# Patient Record
Sex: FEMALE | Race: WHITE
[De-identification: names, ages, dates, MRNs, and addresses within clinical notes are randomized per-mention and may not be internally consistent; named-entity substitution may affect disease eponyms.]

---

## 2020-03-25 ENCOUNTER — HOSPITAL ENCOUNTER (INPATIENT)
Dept: HOSPITAL 25 - ED | Age: 50
LOS: 3 days | Discharge: HOME | DRG: 100 | End: 2020-03-28
Attending: INTERNAL MEDICINE | Admitting: NURSE PRACTITIONER
Payer: COMMERCIAL

## 2020-03-25 DIAGNOSIS — K29.80: ICD-10-CM

## 2020-03-25 DIAGNOSIS — R41.0: ICD-10-CM

## 2020-03-25 DIAGNOSIS — J96.01: ICD-10-CM

## 2020-03-25 DIAGNOSIS — R65.10: ICD-10-CM

## 2020-03-25 DIAGNOSIS — J98.11: ICD-10-CM

## 2020-03-25 DIAGNOSIS — Z83.3: ICD-10-CM

## 2020-03-25 DIAGNOSIS — M48.54XA: ICD-10-CM

## 2020-03-25 DIAGNOSIS — F41.9: ICD-10-CM

## 2020-03-25 DIAGNOSIS — G40.509: Primary | ICD-10-CM

## 2020-03-25 DIAGNOSIS — F10.10: ICD-10-CM

## 2020-03-25 DIAGNOSIS — T40.4X1A: ICD-10-CM

## 2020-03-25 DIAGNOSIS — M54.9: ICD-10-CM

## 2020-03-25 DIAGNOSIS — F11.10: ICD-10-CM

## 2020-03-25 DIAGNOSIS — T42.6X5A: ICD-10-CM

## 2020-03-25 DIAGNOSIS — Z82.5: ICD-10-CM
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
## 2020-03-25 DIAGNOSIS — E87.70: ICD-10-CM

## 2020-03-25 DIAGNOSIS — R32: ICD-10-CM

## 2020-03-25 DIAGNOSIS — K57.30: ICD-10-CM

## 2020-03-25 DIAGNOSIS — Z82.49: ICD-10-CM

## 2020-03-25 DIAGNOSIS — M62.82: ICD-10-CM

## 2020-03-25 DIAGNOSIS — J44.1: ICD-10-CM

## 2020-03-25 DIAGNOSIS — G93.40: ICD-10-CM

## 2020-03-25 DIAGNOSIS — T42.6X1A: ICD-10-CM

## 2020-03-25 DIAGNOSIS — F17.210: ICD-10-CM

## 2020-03-25 DIAGNOSIS — F19.10: ICD-10-CM

## 2020-03-25 DIAGNOSIS — D72.829: ICD-10-CM

## 2020-03-25 LAB
ALBUMIN SERPL BCG-MCNC: 4 G/DL (ref 3.2–5.2)
ALBUMIN/GLOB SERPL: 1.4 {RATIO} (ref 1–3)
ALP SERPL-CCNC: 65 U/L (ref 34–104)
ALT SERPL W P-5'-P-CCNC: 37 U/L (ref 7–52)
ANION GAP SERPL CALC-SCNC: 9 MMOL/L (ref 2–11)
APAP SERPL-MCNC: < 15 MCG/ML
AST SERPL-CCNC: 31 U/L (ref 13–39)
BASOPHILS # BLD AUTO: 0 10^3/UL (ref 0–0.2)
BUN SERPL-MCNC: 16 MG/DL (ref 6–24)
BUN/CREAT SERPL: 21.6 (ref 8–20)
CALCIUM SERPL-MCNC: 8.9 MG/DL (ref 8.6–10.3)
CHLORIDE SERPL-SCNC: 102 MMOL/L (ref 101–111)
EOSINOPHIL # BLD AUTO: 0 10^3/UL (ref 0–0.6)
GLOBULIN SER CALC-MCNC: 2.8 G/DL (ref 2–4)
GLUCOSE SERPL-MCNC: 138 MG/DL (ref 70–100)
HCO3 SERPL-SCNC: 24 MMOL/L (ref 22–32)
HCT VFR BLD AUTO: 50 % (ref 35–47)
HGB BLD-MCNC: 17.2 G/DL (ref 12–16)
INR PPP/BLD: 1.1 (ref 0.82–1.09)
LYMPHOCYTES # BLD AUTO: 1.8 10^3/UL (ref 1–4.8)
MCH RBC QN AUTO: 32 PG (ref 27–31)
MCHC RBC AUTO-ENTMCNC: 34 G/DL (ref 31–36)
MCV RBC AUTO: 92 FL (ref 80–97)
MONOCYTES # BLD AUTO: 2.4 10^3/UL (ref 0–0.8)
NEUTROPHILS # BLD AUTO: 20.6 10^3/UL (ref 1.5–7.7)
NRBC # BLD AUTO: 0 10^3/UL
NRBC BLD QL AUTO: 0
PLATELET # BLD AUTO: 239 10^3/UL (ref 150–450)
POTASSIUM SERPL-SCNC: 3.1 MMOL/L (ref 3.5–5)
PROT SERPL-MCNC: 6.8 G/DL (ref 6.4–8.9)
RBC # BLD AUTO: 5.47 10^6 /UL (ref 3.7–4.87)
SALICYLATES SERPL-MCNC: < 2.5 MG/DL (ref ?–30)
SODIUM SERPL-SCNC: 135 MMOL/L (ref 135–145)
WBC # BLD AUTO: 24.8 10^3/UL (ref 3.5–10.8)

## 2020-03-25 PROCEDURE — 95819 EEG AWAKE AND ASLEEP: CPT

## 2020-03-25 PROCEDURE — 71275 CT ANGIOGRAPHY CHEST: CPT

## 2020-03-25 PROCEDURE — 84146 ASSAY OF PROLACTIN: CPT

## 2020-03-25 PROCEDURE — 96367 TX/PROPH/DG ADDL SEQ IV INF: CPT

## 2020-03-25 PROCEDURE — 85610 PROTHROMBIN TIME: CPT

## 2020-03-25 PROCEDURE — 00JU3ZZ INSPECTION OF SPINAL CANAL, PERCUTANEOUS APPROACH: ICD-10-PCS | Performed by: EMERGENCY MEDICINE

## 2020-03-25 PROCEDURE — 74177 CT ABD & PELVIS W/CONTRAST: CPT

## 2020-03-25 PROCEDURE — 93306 TTE W/DOPPLER COMPLETE: CPT

## 2020-03-25 PROCEDURE — G0480 DRUG TEST DEF 1-7 CLASSES: HCPCS

## 2020-03-25 PROCEDURE — 81003 URINALYSIS AUTO W/O SCOPE: CPT

## 2020-03-25 PROCEDURE — 72129 CT CHEST SPINE W/DYE: CPT

## 2020-03-25 PROCEDURE — 96375 TX/PRO/DX INJ NEW DRUG ADDON: CPT

## 2020-03-25 PROCEDURE — 71045 X-RAY EXAM CHEST 1 VIEW: CPT

## 2020-03-25 PROCEDURE — 80307 DRUG TEST PRSMV CHEM ANLYZR: CPT

## 2020-03-25 PROCEDURE — 99285 EMERGENCY DEPT VISIT HI MDM: CPT

## 2020-03-25 PROCEDURE — 81015 MICROSCOPIC EXAM OF URINE: CPT

## 2020-03-25 PROCEDURE — 83605 ASSAY OF LACTIC ACID: CPT

## 2020-03-25 PROCEDURE — 82746 ASSAY OF FOLIC ACID SERUM: CPT

## 2020-03-25 PROCEDURE — 96365 THER/PROPH/DIAG IV INF INIT: CPT

## 2020-03-25 PROCEDURE — 85025 COMPLETE CBC W/AUTO DIFF WBC: CPT

## 2020-03-25 PROCEDURE — 82607 VITAMIN B-12: CPT

## 2020-03-25 PROCEDURE — 36415 COLL VENOUS BLD VENIPUNCTURE: CPT

## 2020-03-25 PROCEDURE — 86140 C-REACTIVE PROTEIN: CPT

## 2020-03-25 PROCEDURE — 80171 DRUG SCREEN QUANT GABAPENTIN: CPT

## 2020-03-25 PROCEDURE — 84484 ASSAY OF TROPONIN QUANT: CPT

## 2020-03-25 PROCEDURE — 83735 ASSAY OF MAGNESIUM: CPT

## 2020-03-25 PROCEDURE — 80329 ANALGESICS NON-OPIOID 1 OR 2: CPT

## 2020-03-25 PROCEDURE — 87040 BLOOD CULTURE FOR BACTERIA: CPT

## 2020-03-25 PROCEDURE — 93005 ELECTROCARDIOGRAM TRACING: CPT

## 2020-03-25 PROCEDURE — 85027 COMPLETE CBC AUTOMATED: CPT

## 2020-03-25 PROCEDURE — 80053 COMPREHEN METABOLIC PANEL: CPT

## 2020-03-25 PROCEDURE — 82550 ASSAY OF CK (CPK): CPT

## 2020-03-25 PROCEDURE — 70450 CT HEAD/BRAIN W/O DYE: CPT

## 2020-03-25 PROCEDURE — 83690 ASSAY OF LIPASE: CPT

## 2020-03-25 PROCEDURE — 80048 BASIC METABOLIC PNL TOTAL CA: CPT

## 2020-03-25 PROCEDURE — 80320 DRUG SCREEN QUANTALCOHOLS: CPT

## 2020-03-26 LAB
ANION GAP SERPL CALC-SCNC: 8 MMOL/L (ref 2–11)
BASOPHILS # BLD AUTO: 0.1 10^3/UL (ref 0–0.2)
BUN SERPL-MCNC: 12 MG/DL (ref 6–24)
BUN/CREAT SERPL: 17.9 (ref 8–20)
CALCIUM SERPL-MCNC: 8.6 MG/DL (ref 8.6–10.3)
CHLORIDE SERPL-SCNC: 102 MMOL/L (ref 101–111)
CK SERPL-CCNC: (no result) U/L (ref 10–223)
EOSINOPHIL # BLD AUTO: 0 10^3/UL (ref 0–0.6)
FOLATE SERPL-MCNC: > 20 NG/ML (ref 3.99–?)
GLUCOSE SERPL-MCNC: 117 MG/DL (ref 70–100)
HCO3 SERPL-SCNC: 23 MMOL/L (ref 22–32)
HCT VFR BLD AUTO: 50 % (ref 35–47)
HGB BLD-MCNC: 17.6 G/DL (ref 12–16)
LYMPHOCYTES # BLD AUTO: 3.1 10^3/UL (ref 1–4.8)
MAGNESIUM SERPL-MCNC: 2 MG/DL (ref 1.9–2.7)
MCH RBC QN AUTO: 32 PG (ref 27–31)
MCHC RBC AUTO-ENTMCNC: 35 G/DL (ref 31–36)
MCV RBC AUTO: 92 FL (ref 80–97)
MONOCYTES # BLD AUTO: 2.4 10^3/UL (ref 0–0.8)
NEUTROPHILS # BLD AUTO: 16.5 10^3/UL (ref 1.5–7.7)
NRBC # BLD AUTO: 0 10^3/UL
NRBC BLD QL AUTO: 0
PLATELET # BLD AUTO: 234 10^3/UL (ref 150–450)
POTASSIUM SERPL-SCNC: (no result) MMOL/L (ref 3.5–5)
RBC # BLD AUTO: 5.49 10^6 /UL (ref 3.7–4.87)
SODIUM SERPL-SCNC: 133 MMOL/L (ref 135–145)
TROPONIN I SERPL-MCNC: 0.01 NG/ML (ref ?–0.03)
WBC # BLD AUTO: 22 10^3/UL (ref 3.5–10.8)

## 2020-03-26 RX ADMIN — PIPERACILLIN AND TAZOBACTAM SCH MLS/HR: 3; .375 INJECTION, POWDER, LYOPHILIZED, FOR SOLUTION INTRAVENOUS; PARENTERAL at 16:57

## 2020-03-26 RX ADMIN — Medication SCH: at 11:13

## 2020-03-26 RX ADMIN — FOLIC ACID SCH: 1 TABLET ORAL at 11:13

## 2020-03-26 RX ADMIN — DEXMEDETOMIDINE HYDROCHLORIDE SCH MLS/HR: 100 INJECTION, SOLUTION, CONCENTRATE INTRAVENOUS at 15:04

## 2020-03-26 RX ADMIN — SODIUM CHLORIDE SCH MLS/HR: 900 IRRIGANT IRRIGATION at 12:04

## 2020-03-26 RX ADMIN — VANCOMYCIN HYDROCHLORIDE SCH MLS/HR: 1 INJECTION, POWDER, LYOPHILIZED, FOR SOLUTION INTRAVENOUS at 22:08

## 2020-03-26 RX ADMIN — THERA TABS SCH: TAB at 11:13

## 2020-03-26 RX ADMIN — LEVETIRACETAM SCH MLS/HR: 100 INJECTION, SOLUTION, CONCENTRATE INTRAVENOUS at 12:38

## 2020-03-26 RX ADMIN — SODIUM CHLORIDE SCH MLS/HR: 900 IRRIGANT IRRIGATION at 01:30

## 2020-03-26 NOTE — EEG
ELECTROENCEPHALOGRAPHY:

 

DATE OF STUDY:  03/26/20

 

LOCATION:  She is an outpatient in room 339.

 

REFERRING PHYSICIAN:  Yolanda Crandall NP

 

CLINICAL PROBLEM:  New onset seizures the evening before this recording.   The 
patient is delirious.

 

MEDICATIONS:  Include:

1.  Keppra.

2.  Folic acid.

3.  Lorazepam detox protocol.

4.  Tramadol p.r.n.

 

REPORT:  This 19-channel EEG is remarkable for background rhythms consisting of 
excessive beta activity seen with a bifrontal predominance.  The patient is 
described as confused and unable to state name or follow commands.  There is 
diffuse background slowing in the low voltage delta and mainly theta range.  
There is an episodic increased theta activity of moderate voltage seen in the 
left frontotemporal region.  The patient may drowse with vertex slowing.  There 
may be some POSTS in the right occipital area not seen in the left.  There are 
no clinical events.  Stage II sleep is not identified.

 

CLINICAL IMPRESSION:  Abnormal EEG due to excessive beta rhythms consistent 
with benzodiazepine drug effect.  In addition, there is diffuse slowing and 
disorganization of background rhythm consistent with diffuse encephalopathy.  
There is suggestion of episodic focal slowing from the left frontal temporal 
region suggestive of possible focal neuronal dysfunction in the left 
frontotemporal region.  There are no ictal events and no epileptiform features 
to this recording.

 

[id number]/[facility] #: [id number]

[facility]

## 2020-03-26 NOTE — CONS
NEUROLOGY CONSULTATION

 

DATE OF CONSULT:  03/26/2020.

 

She is an inpatient in room 339.

 

REFERRING PROVIDER:  Yolanda Crandall NP.

 

PRIMARY CARE PHYSICIAN:  Dr. Amador.

 

CHIEF COMPLAINT:  Seizure, mental status changes.

 

HISTORY OF PRESENT ILLNESS:  Demetrice Johnson is a 49-year-old woman who 
presented to the emergency department via ambulance yesterday, reportedly with 
new onset seizures.  The study is from the medical record as the patient is 
current encephalopathic.  According to the records, she remembers waking up 
with ambulance attendants in her home.  Her  reported to the ambulance 
attendants that she had a seizure the night before and then again yesterday 
early in the day. According the descriptions in the records, they lasted 
several minutes and she appeared diaphoretic, foaming at the mouth, tense 
muscles and her face became dark. The patient was able to give history to 
Yolanda Crandall NP last evening in the emergency room.  She stated that she 
remembers going to the bathroom and the next thing she knows ambulance 
attendants were there.  She reported and her  corroborated that she had 
been sick for about three days with abdominal pain, vomiting, and nausea.  She 
had mid to upper back pain and a frontal headache.  She is not on any 
prescription medications, but according to the discussion between Yolanda Crandall and her , Ramon, over the phone last evening, she was taking his 
Gabapentin.  When she came in, she had a positive urine tox screen for opiates 
and cannabinoids and Dr. Singh tells me that it has been confirmed that she 
was also taking her husbands Suboxone.  The patient admitted to Yolanda Crandall 
that she had had a marijuana treat some time that week.  She also reported that 
she had used an opiate based pill about a week before, but she was unsure what 
it was.  Subsequently, the discovery that she had taken her 's Suboxone 
was made.  An attempt was made at a lumbar puncture in the emergency room, but 
it was unsuccessful.  She had a CT scan of the brain that was interpreted as 
normal.  She was febrile in the emergency room with a temperature of 100.0 and 
she had an elevated white blood cell count. She was treated with Keppra, 
Ampicillin, Ceftriaxone, and Vancomycin in the emergency room, but it was not 
continued.

 

On examination this morning, she is not able to provide any coherent history.  
She does nod at one point when asked if she has a headache.

 

PAST MEDICAL HISTORY:  Notable for a breast biopsy last April, interpreted as 
benign breast tissue with nonproliferative fibrocystic change.  This was done 
for left nipple discharge.  She has a history of cholelithiasis and 
cholecystitis in 2002.  Review of prior medical records indicates a history of 
frequent headaches, anxiety, and bronchitis.

 

HOME MEDICATIONS:  She is reported as having no prescription medications at home
, but has been taking her 's Gabapentin and Suboxone.

 

CURRENT MEDICATIONS:  Her current medications consist of:

1.  Keppra 500 mg p.o. twice per day.

2.  Folic acid 1 mg p.o. daily.

3.  Lorazepam up to 6 mg per [facility] protocol.

4.  Multivitamin one p.o. daily.

5.  Zofran 4 mg IV q.6 hours as needed for nausea.

6.  Vancomycin.

7.  Zosyn.

8.  Piperacillin/Tazobactam have been restarted.

9.  She also also been prescribed Thiamin 100 mg p.o. per day.

10. Vancomycin has been restarted at 1500 mg.

 

ALLERGIES:  According to our computer records, she does not have any drug 
allergies.

 

REVIEW OF SYSTEMS:  From the patient is unproductive due to her encephalopathy.
  Her mentation is too disturbed to get any reliable responses.

 

PHYSICAL EXAM:  Most recent temperature is 98.5, temperature this morning was 
100.2 maximum, T-max during the hospitalization is 100.2.  Blood pressure was 
very elevated early on at about 180/100 to 110 and more recent is down to 142/
85.  She has been tachycardic in the 100 to 110 range and her respiratory rates 
running about 30.  Oxygen saturation is 92 percent on room air. Heart tones 
sound normal. I do not hear any murmurs. Skin is warm and somewhat diaphoretic.
  Oral mucosa is moist.  She has a fair amount of sialorrhea.  On incomplete 
look at her tongue, I did not see any bite marks.  Neurologically she is moving 
restlessly and is nonverbal.  She does nod her head occasionally to a question, 
but it is not considered reliable.  Her pupils react briskly from about 4 to 5 
mm down to about 2.5 to 3 mm.  Her eye movements seem full.  She has roving 
movements and looks about the room.  I cannot get an adequate look at her 
fundus.  She does not consistently respond to visual threat.  She does weakly 
respond to nasal tickle symmetrically.  Facial movement looks symmetrical.  She 
has restless movements of all limbs symmetrically.  There is no myoclonus or 
other adventitious movements noted.  Plantar responses are briskly withdraw 
bilaterally.  She does not follow any commands.

 

DIAGNOSTIC STUDIES/LAB DATA:  EEG just done within the hour which reveals 
excessive beta activity consistent with benzodiazepine drug effect.  There is 
diffuse slowing consistent with diffuse cerebral dysfunction.  There is 
occasional focal slowing from the left frontotemporal area, but there are no 
epileptiform discharges.

 

Other laboratory data is notable for a CBC with a white blood cell count on 
admission of 24.8 with 20.6% absolute neutrophils and a persistent elevated 
white blood cell count this morning at 9:00 a.m. at 22,000.  Again with 
elevated neutrophils at 16.5.  Her hemoglobin is elevated at 17.2 and platelet 
count is normal at 239,000.  Toxicology screen from yesterday was positive for 
opiates and cannabinoids.  Serum alcohol was less than 10.  Her chemistry 
profile was unremarkable.  Initial troponin is 0.01.  C-reactive protein is 
6.5.  Vitamin B12 level this morning is normal at 404.  Prolactin level 
yesterday at 1630 hours was 7.8.  INR is borderline elevated at 1.10.

 

I reviewed her head CT which was interpreted as normal.  There is a suspicion 
on my review of white matter hypodensity in the right apical area of the right 
hemisphere.  Chest x-ray on admission is interpreted as showing patchy 
atelectasis versus consolidation of the right lung base.  A CT of the abdomen 
and pelvis on admission is notable for mild thickening and possible edema 
involving the proximal duodenum.  the radiologist cannot exclude mild 
duodenitis.  CT scan of the thoracic spine was interpreted as no significant 
pathology.  EKG is reviewed and reveals sinus tachycardia and consider right 
atrial enlargement.

 

IMPRESSION:  Impression is that of encephalopathy of unclear etiology, but an 
infectious etiology remains high in the differential.  There is a possibility 
of a right lung infection, but also the possibility of an intracranial 
infection remains.  Her CT scan of the brain is interrupted as normal, but to 
my review I think this is a suspicious area in the very apical white matter of 
the right hemisphere.

 

I discussed my initial impression with Dr. Singh and recommended that the 
patient have an MRI scan of the brain with and without contrast to look for an 
abscess.  If that negative, then I think a lumbar puncture would be indicated.  
Dr. Singh has reinstituted broad spectrum antibiotic coverage.  I agree with 
Keppra therapy for now and I would change it to intravenous.  I will continue 
to follow her along with you.

 

 [id number]/[facility] #: [id number]

KARINA

## 2020-03-26 NOTE — PN
Subjective


Date of Service: 03/26/20


Interval History: 





Admitted overnight. LP not successful. EEG performed and results pending. 





Received collateral from patient's mother Aye Salcedo that patient has been 

experiencing emesis for 3 days. States patient reports she felt very ill for 3 

days and stayed in bed. Starting yesterday, patient started experiencing severe 

upper back pain, she thought it was from being in bed so long. Then, pt's son 

informed pt's mother that pt's  noted that she lost consciousness and so 

he "banged her head against the floor" several times to "wake her up". Her 

 has "brain damage" from history of multiple overdoses. Per patient's 

mother, her family called the ambulance 2 days ago, but the patient refused to 

come to the hospital. They called again yesterday and that is when she 

presented to the ER. The patient is too embarassed about her history of OUD (

intranasal heroin) and has not sought care for herself but takes her 's 

gabapentin and Suboxone. Mother denies h/o IV drug use.





On exam this AM, patient able to state her name and state she has a headache, 

but frequently has agitated delirium and cannot participate in interview. Due 

to respiratory distress and hypoxia, she was transferred to the ICU.








Objective


Active Medications: 








Acetaminophen (Tylenol Tab*)  650 mg PO Q4H PRN


   PRN Reason: PAIN - MILD


Folic Acid (Folvite Tab*)  1 mg PO DAILY [facility]


Sodium Chloride (Ns 0.9% 1000 Ml**)  1,000 mls @ 100 mls/hr IV PER RATE [facility]


   Last Admin: 03/26/20 01:30 Dose:  100 mls/hr


Vancomycin HCl 1,000 mg/ (Sodium Chloride)  250 mls @ 166.667 mls/hr IVPB ONCE 

ONE; Protocol


   Stop: 03/26/20 11:18


Piperacillin Sod/Tazobactam (Sod 3.375 gm/ Sodium Chloride)  100 mls @ 200 mls/

hr IVPB ONCE ONE


   Stop: 03/26/20 10:19


Levetiracetam (Keppra Tab*)  500 mg PO BID [facility]


Lorazepam (Ativan Tab(*))  0 - 6 mg PO .PER [facility] PROTOCOL [facility]; Protocol


   Last Admin: 03/26/20 04:47 Dose:  4 mg


Lorazepam (Ativan Inj*)  0 - 3 mg IV PUSH .PER WAM PROTOCOL JENELLE; Protocol


Miscellaneous (Ativan Pyxis Key)  1 ea N/A .ATIVAN IV KEY PRN


   PRN Reason: PYXIS KEY


Multivitamins/Minerals (Theragran/Minerals Tab*)  1 tab PO DAILY [facility]


Nicotine Polacrilex (Nicotine Gum*)  2 mg PO Q2H PRN


   PRN Reason: CRAVING


Ondansetron HCl (Zofran Inj*)  4 mg IV Q4H PRN


   PRN Reason: NAUSEA


Pharmacy Consult (Vancomycin Per Pharmacy*)  1 note FOLLOW UP .VANC PER 

PHARMACY JENELLE; Protocol


Pharmacy Consult (Zosyn Per Pharmacy*)  1 note FOLLOW UP .ZOSYN PER PHARMACY [facility]


Pharmacy Consult (Zosyn Per Pharmacy*)  1 note FOLLOW UP .ZOSYN PER PHARMACY [facility]


Thiamine HCl (Vitamin B-1 Tab*)  100 mg PO DAILY [facility]


Tramadol HCl (Ultram*)  50 mg PO Q6H PRN


   PRN Reason: PAIN - MODERATE


   Last Admin: 03/26/20 05:10 Dose:  50 mg








 Vital Signs - 8 hr











  03/26/20 03/26/20 03/26/20





  02:27 02:34 02:36


 


Temperature   


 


Pulse Rate   


 


Respiratory 20 20 20





Rate   


 


Blood Pressure   





(mmHg)   


 


O2 Sat by Pulse   





Oximetry   














  03/26/20 03/26/20 03/26/20





  04:02 04:13 04:22


 


Temperature 97.9 F  


 


Pulse Rate 96  


 


Respiratory 20 20 20





Rate   


 


Blood Pressure 148/99  





(mmHg)   


 


O2 Sat by Pulse 94  





Oximetry   














  03/26/20 03/26/20 03/26/20





  04:47 05:10 06:45


 


Temperature   


 


Pulse Rate   


 


Respiratory 20 20 20





Rate   


 


Blood Pressure   





(mmHg)   


 


O2 Sat by Pulse   





Oximetry   














  03/26/20 03/26/20 03/26/20





  06:49 07:24 07:34


 


Temperature 100.2 F  


 


Pulse Rate 106  


 


Respiratory 36 30 32





Rate   


 


Blood Pressure 176/115  





(mmHg)   


 


O2 Sat by Pulse   





Oximetry   














  03/26/20 03/26/20





  07:37 08:02


 


Temperature  98.8 F


 


Pulse Rate  105


 


Respiratory 32 30





Rate  


 


Blood Pressure  146/90





(mmHg)  


 


O2 Sat by Pulse  92





Oximetry  











Oxygen Devices in Use Now: None


Appearance: ill-appearing woman, disheveled


Eyes: No Scleral Icterus


Ears/Nose/Mouth/Throat: - - OP with secretions


Neck: NL Appearance and Movements; NL JVP, Trachea Midline


Respiratory: - - mucous rattle obscuring most adventitious sounds


Cardiovascular: NL Sounds; No Murmurs; No JVD, RRR


Abdominal: - - no grimace to palpation, soft


Extremities: No Edema


Skin: No Rash or Ulcers, - - dirt under nails and over feet


Neurological: - - can state name, will occasionally track with eyes and close 

eyes to visual threat, PERRL, moving all extremities spontaneously and 

symmetrically but unable to follow commands


Result Diagrams: 


 03/26/20 08:59





 03/26/20 11:36





Assess/Plan/Problems-Billing


Assessment: 





49W with OUD (intranasal heroin), h/o alcohol use disorder, and anxiety, who 

presents from home with subacute malaise, back pain, fevers, and eventually 

loss of consciousness. Found with altered mental status and leukocytosis. 








- Patient Problems


(1) Delirium


Comment: Pt with h/o etoh and opioid abuse, and could be withdrawing from 

either. Also with questionable history of seizure at home prior to arrival - 

could be post-ictal. Per collateral from family - pt took extra doses of her 

's suboxone and gapabentin for significant back pain, so medication side 

effect could be playing a role. Finally, this could be toxic-metabolic 

encephalopathy from unclear infection, given fevers and leukocytosis. 


- Neuro consult greatly apprecaited and Keppra started


- [facility] protocol initiated


- supportive care


- on broad spectrum abx in case of bacterial infection


- f/u gabapentin level   





(2) Respiratory distress


Comment: Possible consolidation on CXR, in context of leukocytosis and fevers. 

Unknown if patient had cough or dyspnea. Has been vomiting over the last 3 days 

and may have had a seizure, so aspiration is also likely.


- on broad spectrum abx


- supplemental O2


- transferred to ICU for higher level of care   





(3) Opioid use disorder


Comment: History of intranasal heroin and taking her 's suboxone. Family 

denies IVDU.


- monitor for signs of withdrawal and treat prn symptoms


- consider [facility] referral on discharge   





(4) DVT prophylaxis


Comment: 


- lovenox

## 2020-03-26 NOTE — HP
CC:  Dr. Amador*

 

ADMISSION HISTORY AND PHYSICAL:

 

DATE OF ADMISSION:  03/25/20

 

PRIMARY CARE PHYSICIAN:  Dr. Amador.

 

ADMITTING PHYSICIAN:  Dr. Garcia* (Isreal Flores NP).

 

CHIEF COMPLAINT:  Sick for the last few days.

 

HISTORY OF PRESENT ILLNESS:  Ms. Johnson is a 49-year-old female with past 
medical history significant for anxiety.  She presented to the emergency 
department today via EMS after her  called for what he reports was a 
seizure.  The patient states that she went to bed last night, got up to go to 
the bathroom 1 time and the next thing she remembers is waking up with EMS in 
her home.  She states that her  says that she had a seizure but she is 
unaware of this.  She does not remember any such activity.  She does not 
remember falling. Denies any known injuries or accidents.  She denies any 
history of prior seizures or familial history of seizures.  She states that she 
was sick for the last 3 days with nausea, vomiting, and diarrhea.  She has also 
had some abdominal pain and some left-sided upper back pain for the last few 
days.  She denies eating any new foods or eating any foods that her  did 
not eat. States that her  is in his usual state of health.  States that 
she has had very little fluids and no food for the last few days.  States that 
the abdominal pain is near the middle of her abdomen.  It feels tight at times 
and she is often nauseous.  The back pain is to the mid to upper left area of 
her back, rates 9/10, constant, burning/tight pain.  She denies any respiratory 
symptoms.  No rhinorrhea, sore throat, cough, or shortness of breath.  Denies 
any chest pain unusual or significant joint or muscle aches other than stated 
already.  States that she has also had a headache for the last few days in the 
frontal area.  She has taken ibuprofen, NyQuil, and DayQuil, unsure the last 
time that these were taken, with little to no relief.  The patient agreed that 
her  could be called.  Her 's name is Ramon, number [phone].
  He states that the patient had what looked like a seizure last night as well 
as earlier today.  He states that both episodes lasted for about a few minutes 
each.  The patient was unresponsive to her , noted that she was not 
breathing, her face was turning "black," she was diaphoretic, foaming at the 
mouth, and her muscles would get extremely tense and he could not break her 
muscle tension.  States that the episode earlier today lasted for approximately 
4 minutes.   denies any recent drug use by his wife other than her 
taking his prescribed gabapentin. States he is unaware of any other medications 
that she may take.  He is unsure of how many pills she takes at a time.  He 
states that he thinks sometimes she will take about 10 pills at a time. He 
states she takes these for her back pain.  The patient originally denied any 
illicit drug use.  After urine tox screen came back positive for opiates and 
cannabinoids, the patient was re- questioned about these substances.  The 
patient stated that she did recently have an edible marijuana treat.  Denies 
any injectable drug use.  States that she may have had an opiate-based pill 
about a week ago but is unsure of what it was and is unsure of exactly when.  
She is not very forthcoming with this information.  The patient denies any 
bleeding or clotting disorders.  Denies any recent hospitalizations or recent 
antibiotic use.  While in the emergency department, there was an unsuccessful 
attempt at an LP.  Meningitis was within the differential list originally in 
relation to possible seizure activity especially with a white count of 24.8.  
However, it was discussed with both ED provider and Dr. Garcia that it seems 
very unlikely for the patient to have meningitis.  She has no nuchal rigidity, 
negative Brudzinski sign, negative Kernig sign.  While in the emergency 
department, her temperature did reach 100.0, tachycardic at times with heart 
rate in the 80s to low 100s, respiratory rate within normal limits, blood 
pressures high with systolic in the 140s to 190s and diastolic often in the low 
to mid 100s.  She did receive pain medications, antinausea medications, 
ampicillin, ceftriaxone, and vancomycin.  She also received 1 L of normal 
saline.  Due to the patient's unexplained leukocytosis and what sounds like 
seizure-like activity, Hospital Medicine was asked to evaluate the patient for 
admission.

 

PAST MEDICAL HISTORY:  Anxiety.

 

PAST SURGICAL HISTORY:

1.  Tubal ligation in 1993.

2.  Cholecystectomy in 2002.

3.  Breast surgery in 2019.

 

HOME MEDICATIONS:  States no usual home medications.

 

ALLERGIES:  No known drug allergies.

 

FAMILY HISTORY:  Father with heart disease, hypertension, diabetes mellitus, 
and congestive heart failure.  Mother with hypertension.  No known familial 
history of seizures.

 

SOCIAL HISTORY:  The patient states she is undecided about a surrogate decision 
maker.  Wishes to continue as full code status.  She is .  Has 3 
children. 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  States that she smokes 1 pack of 
cigarettes per day, has been doing this for she states 30+ years for a 30+ pack 
year history. Denies any alcohol use.  Admits to drug use as stated in the HPI.
  Denies any other illicit drug use.

 

REVIEW OF SYSTEMS:  A 14-point review of systems was completed with this 
patient. Please see HPI for pertinent positives and negatives.

 

                               PHYSICAL EXAMINATION

 

CONSTITUTIONAL:  The patient is sitting up in bed, appears to be in significant 
discomfort.

 

VITAL SIGNS:  Last vital signs, temp 100.0, pulse 98, respiratory rate 20, O2 
saturation 95% on room air, /95.

 

HEENT:  PERRL.

 

LYMPHATIC:  No cervical lymphadenopathy.

 

RESPIRATORY:  Lung sounds clear throughout bilaterally.  Normal respiratory 
effort.

 

CARDIOVASCULAR:  Heart rate irregular, fast.  S1, S2 present.  No murmurs, rubs
, or gallops noted.

 

GI:  Hyperactive bowel sounds throughout.  Abdomen soft, some tenderness noted 
near epigastric area and slightly more towards the left upper quadrant.

 

EXTREMITIES:  No edema.

 

MUSCULOSKELETAL:  Range of motion and strength within normal limits to all 
extremities.

 

SKIN:  Appears dry and intact.  Per access site, 4 attempted LP covered with 
dressing.  No drainage noted.

 

NEUROLOGIC:  Alert and oriented x3.  Cranial nerves II through XII grossly 
intact. No nuchal rigidity.  Negative Brudzinski sign.  Negative Kernig sign.

 

 DIAGNOSTIC STUDIES/LAB DATA:  CT brain, impression states no acute 
intracranial pathology.

 

Abdomen and pelvis CT, impression states there is mild wall thickening and 
possible wall edema involving the proximal duodenum, cannot exclude mild 
duodenitis.  There is colonic diverticulosis without evidence for acute 
diverticulitis.  No acute traumatic CT pathology of the abdomen or pelvis.

 

Chest x-ray:  Read pending.

 

Thoracic spine CT:  Read pending.

 

EKG:  Sinus tachycardia with a rate of 101 beats per minute.  No significant ST 
elevations or depressions.  This was reviewed with Dr. Garcia.

 

Laboratory values:  WBC 24.8, RBC 5.47, hemoglobin 17.2, hematocrit 50, MCV 92, 
MCH 32, MCHC 34, RDW 13, platelet count 239, absolute neutrophils 20.6, 
absolute monos 2.4.  INR 1.10.  Sodium 135, potassium 3.1, chloride 102, carbon 
dioxide 24, anion gap 9, BUN 16, creatinine 0.74, estimated GFR 83.1, BUN and 
creatinine ratio 21.6, glucose 138, lactic acid 1.1, calcium 8.9, magnesium 
pending.  Total bilirubin 0.8, AST 31, ALT 37, alk phos 265.  Troponin 0.01.  
CRP 6.51.  Total protein 6.8, albumin 4.0, globulin 2.8, albumin-globulin ratio 
1.4, lipase 75, prolactin 7.8. Toxicology report, salicylates less than 2.5.  
Urine opiate screen presumptive positive.  Acetaminophen less than 15.  Urine 
barbiturates not detected.  Urine phencyclidine not detected.  Urine 
amphetamines not detected.  Urine benzodiazepines not detected.  Urine cocaine 
not detected.  Urine cannabinoids presumptive positive.  Serum alcohol less 
than 10.

 

ASSESSMENT AND PLAN:  Ms. Johnson is a 49-year-old female with past medical 
history significant for anxiety.  She presented today to the emergency 
department via EMS after reports of seizure-like activity from her .

 

1.  Seizure-like activity.  Per 's report, the patient had what he 
thought to be 2 seizures over the last couple of days.  Prolactin level 7.8.  
Differentials include adverse effect from unknown amounts of gabapentin as 
status epilepticus is a known adverse effect versus underlying infection versus 
drug withdrawal. Neurology consult.  EEG ordered.  Keppra loading dose and 
maintenance dose ordered. Gabapentin level ordered.  Seizure precautions.

2.  Systemic inflammatory response syndrome.  WBC 24.8, temp 100.0, and 
tachycardia. Lactic 1.1. The patient does not appear to have acute systemic 
infection at this time. Blood cultures drawn.  The patient has received 1 L IVF 
bolus and has been hypertensive since arrival.  She has also received 
antibiotics in the emergency department including ampicillin, ceftriaxone, and 
vancomycin.  We will be watching her very closely.  Labs in a.m.

3.  Leukocytosis.  If the patient was truly having seizures, this could be 
transient spike in WBCs.  However, there may be an unidentified infection. 
Meningitis is very low on the list of differentials considering the negative 
nuchal rigidity, negative Brudzinski, and negative Kernig sign.  However, she 
does not appear to have any other obvious signs of infection at this time.  
Again, we will continue to watch her very closely.  The patient also has been 
having nausea, vomiting, and diarrhea for the last few days.  She is likely 
dehydrated, which could also be a potentiating factor for leukocytosis.  Labs 
in a.m.

4.  Back pain.  It sounds as if the patient has had ongoing back pain for quite 
some time.  However, she believes that this pain to her left upper back is 
somewhat new over the last few days.  Her  notes that she fell in the 
bathroom prior to her seizure earlier today.  The patient is unaware if she has 
had any other episodes of falling or injury.  However, given the clinical 
picture, it is reasonable to assume that this may be from injury.  CT thoracic 
spine ordered.

5.  Hypokalemia.  Potassium level 3.1.  Replete with potassium chloride tab 40 
mEq x2 doses ordered.  Labs in a.m.

6.  Hypertension.  The patient has been hypertensive since arrival.  I will 
continue to monitor her blood pressures.  If she continues to be hypertensive, 
it will be reasonable to add an ACE inhibitor to her medication regimen.

7.  Polysubstance abuse.  Social work consult entered.

8.  Tobacco use.  Tobacco cessation education ordered.  Nicotine gum p.r.n. 
ordered.

9.  FEN:  Regular diet.

10.  Code status:  Full code.

11.  DVT prophylaxis:  SCDs.  Holding chemical DVT prophylaxis in relation to 
attempted LP.

12.  Consults.  Neurology consult.  We will contact in a.m.

 

TIME SPENT:  Approximately 75 minutes was spent on this admission with about 35 
being face-to-face with the patient for interview, exam, and reviewing plan of 
care.

 

This case has been reviewed by my attending physician, Dr. Garcia, and he 
agrees with this plan.

 

 ____________________________________ 

ISREAL FLORES NP

 

[id number]/CPS #: [id number]



ADDENDUM: At approximately 0420 nursing called stating that pt is becoming more 
agitated, pulling at her medical devices, not seeming to be in as much pain but 
generally agitated and more restless. Reports that pt is diaphoretic. VSS with 
HR 96. Given her clinical findings, inconsistencies during history taking, and 
current presentation it is reasonable to assume she is withdrawing, may be 
opiates as they were found via urine tox screen but may be another substance 
such as ETOH. She will be placed on WAM protocol with ativan PRN especially 
given that she likely had recent seizures, dilaudid d/c'd, will have 
acetaminophen and tramadol available for pain. Recommend avoiding any other 
higher potency narcotic medications.

MTDD

## 2020-03-26 NOTE — PN
Progress Note





- Progress Note


Date of Service: 03/26/20


Note: 





Patient initally presented to hospital with possible seizure like activity at 

home, and altered mental status. She's been vomiting and complaining of upper 

back pain for a few days as well. CTH grossly negative on admission but is a 

poor study. Upon admission to ICU, patient delirious, attempts to track but 

having difficulty answering questions. She does not follow commands. Her speech 

is garbled. She does move all extremities spontaneously. She is unable to 

provide any information. She is known to snort heroin, and takes her husbands 

gabapentin and suboxone. She is tachypneic but saturating well on oxymask. LP 

was attempted in ED and was unsuccessful. MRI brain with and without, MRI 

thoracic spine pending. EEG showed possible episodic foal slowing from the 

right frontal temporal region suggestive of possible focal neuronal dysfunction 

in the right frontotemporal region. She continued to be restless, agitated and 

unable to be redirected. She required 5mg haldol x 1 and was started on 

precedex drip. Differentials include: meningitis, brain abscess, herpes 

encephalitis, epidural abscess, drug overdose, drug withdrawal.
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
## 2020-03-26 NOTE — CONS
NEUROLOGY CONSULTATION:

 

ADDENDUM:



DATE OF CONSULT:  03/26/20

 

LOCATION:  She is in ICU bed 2.

 

INTENSIVIST:  Aubree Alvarez NP

 

INTERVAL HISTORY:  Since I saw Demetrice this morning she was transferred to the 
intensive care unit.  In the intensive unit, for her agitation she was put on 
Precedex.  There had been no clinical changes otherwise.  There had been no 
observed seizures.

 

CURRENT MEDICATIONS:  Consist of:

1.  Keppra 750 mg IV q.12 hours.

2.  Nicotine patch.

3.  Zosyn IV q.8 hours.

4.  Precedex infusion.

5.  Thiamine 100 mg p.o.

6.  Vancomycin per pharmacy dosing protocol.

7.  Zosyn per pharmacy dosing protocol.

 

PHYSICAL EXAMINATION:  On exam most recent temperature is 98.3, blood pressure 
169/106.  Neurological Exam:  She does not arouse immediately to voice, but as 
I examine her she starts to arouse and answer questions.  Pupils respond 
equally from about 3-1/2 down to 2 mm.  Eye movements are normal.  She visually 
regards the examiner when she is awake.  Speech is minimally dysarthric.  
Facial musculature is symmetric.  She moves all her limbs purposely.  Both legs 
are a little stiff. There is no asterixis or myoclonus.  She knows that she is 
in the hospital.  She does not know the day of the week or how many days she 
has been here.  She currently denies headache or back pain.

 

New laboratory data includes an elevated CPK from earlier this morning of 11,
708.

 

IMPRESSION:  Agitated delirium after apparently observed seizures in a patient 
with substance abuse problems.  She has evidence of rhabdomyolysis on exam, 
which might explain her elevated white blood cell count and fever.  She still 
needs imaging of her brain with MRI scanning and I still would like to get an 
MRI scan of her spine given her prior complaints of back pain and her history 
of drug use.  I have discussed the case with Dr. Taylor and Naomi Alvarez 
and will continue to follow.

 

 [id number]/CPS #: [id number]

KARINA

## 2020-03-27 LAB
ANION GAP SERPL CALC-SCNC: 7 MMOL/L (ref 2–11)
BUN SERPL-MCNC: 14 MG/DL (ref 6–24)
BUN/CREAT SERPL: 19.2 (ref 8–20)
CALCIUM SERPL-MCNC: 8.4 MG/DL (ref 8.6–10.3)
CHLORIDE SERPL-SCNC: 110 MMOL/L (ref 101–111)
CK SERPL-CCNC: 4311 U/L (ref 10–223)
GLUCOSE SERPL-MCNC: 127 MG/DL (ref 70–100)
HCO3 SERPL-SCNC: 20 MMOL/L (ref 22–32)
HCT VFR BLD AUTO: 49 % (ref 35–47)
HGB BLD-MCNC: 16.7 G/DL (ref 12–16)
MCH RBC QN AUTO: 32 PG (ref 27–31)
MCHC RBC AUTO-ENTMCNC: 34 G/DL (ref 31–36)
MCV RBC AUTO: 94 FL (ref 80–97)
PLATELET # BLD AUTO: 195 10^3/UL (ref 150–450)
POTASSIUM SERPL-SCNC: 4 MMOL/L (ref 3.5–5)
RBC # BLD AUTO: 5.25 10^6 /UL (ref 3.7–4.87)
SODIUM SERPL-SCNC: 137 MMOL/L (ref 135–145)
WBC # BLD AUTO: 17.6 10^3/UL (ref 3.5–10.8)

## 2020-03-27 RX ADMIN — VANCOMYCIN HYDROCHLORIDE SCH MLS/HR: 1 INJECTION, POWDER, LYOPHILIZED, FOR SOLUTION INTRAVENOUS at 06:21

## 2020-03-27 RX ADMIN — PANTOPRAZOLE SODIUM SCH MG: 40 TABLET, DELAYED RELEASE ORAL at 14:41

## 2020-03-27 RX ADMIN — DEXMEDETOMIDINE HYDROCHLORIDE SCH MLS/HR: 100 INJECTION, SOLUTION, CONCENTRATE INTRAVENOUS at 02:19

## 2020-03-27 RX ADMIN — LEVETIRACETAM SCH MLS/HR: 100 INJECTION, SOLUTION, CONCENTRATE INTRAVENOUS at 12:57

## 2020-03-27 RX ADMIN — HEPARIN SODIUM SCH UNITS: 5000 INJECTION INTRAVENOUS; SUBCUTANEOUS at 14:41

## 2020-03-27 RX ADMIN — Medication SCH MG: at 08:37

## 2020-03-27 RX ADMIN — PIPERACILLIN AND TAZOBACTAM SCH MLS/HR: 3; .375 INJECTION, POWDER, LYOPHILIZED, FOR SOLUTION INTRAVENOUS; PARENTERAL at 08:37

## 2020-03-27 RX ADMIN — LEVETIRACETAM SCH MG: 500 TABLET, FILM COATED ORAL at 20:17

## 2020-03-27 RX ADMIN — NICOTINE SCH: 7 PATCH TRANSDERMAL at 08:36

## 2020-03-27 RX ADMIN — METHYLPREDNISOLONE SODIUM SUCCINATE SCH MG: 125 INJECTION, POWDER, FOR SOLUTION INTRAMUSCULAR; INTRAVENOUS at 23:34

## 2020-03-27 RX ADMIN — PIPERACILLIN AND TAZOBACTAM SCH MLS/HR: 3; .375 INJECTION, POWDER, LYOPHILIZED, FOR SOLUTION INTRAVENOUS; PARENTERAL at 17:54

## 2020-03-27 RX ADMIN — FOLIC ACID SCH MG: 1 TABLET ORAL at 08:37

## 2020-03-27 RX ADMIN — METHYLPREDNISOLONE SODIUM SUCCINATE SCH MG: 125 INJECTION, POWDER, FOR SOLUTION INTRAMUSCULAR; INTRAVENOUS at 17:54

## 2020-03-27 RX ADMIN — VANCOMYCIN HYDROCHLORIDE SCH: 1 INJECTION, POWDER, LYOPHILIZED, FOR SOLUTION INTRAVENOUS at 16:04

## 2020-03-27 RX ADMIN — WATER SCH: 100 INJECTION, SOLUTION INTRAVENOUS at 08:35

## 2020-03-27 RX ADMIN — PIPERACILLIN AND TAZOBACTAM SCH MLS/HR: 3; .375 INJECTION, POWDER, LYOPHILIZED, FOR SOLUTION INTRAVENOUS; PARENTERAL at 00:40

## 2020-03-27 RX ADMIN — LEVETIRACETAM SCH MLS/HR: 100 INJECTION, SOLUTION, CONCENTRATE INTRAVENOUS at 00:40

## 2020-03-27 RX ADMIN — PIPERACILLIN AND TAZOBACTAM SCH MLS/HR: 3; .375 INJECTION, POWDER, LYOPHILIZED, FOR SOLUTION INTRAVENOUS; PARENTERAL at 23:34

## 2020-03-27 RX ADMIN — MORPHINE SULFATE PRN MG: 4 INJECTION, SOLUTION INTRAMUSCULAR; INTRAVENOUS at 22:31

## 2020-03-27 RX ADMIN — THERA TABS SCH TAB: TAB at 08:37

## 2020-03-27 RX ADMIN — DEXMEDETOMIDINE HYDROCHLORIDE SCH MLS/HR: 100 INJECTION, SOLUTION, CONCENTRATE INTRAVENOUS at 10:56

## 2020-03-27 RX ADMIN — HEPARIN SODIUM SCH UNITS: 5000 INJECTION INTRAVENOUS; SUBCUTANEOUS at 22:31

## 2020-03-27 NOTE — PN
Date of Service: 03/27/20


Critical Care Services: 





Ms. Johnson states that she feels short of breath this morning.  She denies 

other complaint including chest pain, nausea or abdominal pain.





She notes that she has taken her 's gabapentin recently (a couple of 

doses per her) and has been taking suboxone.  She states that she was feeling 

well yesterday.  She denies any recent upper respiratory symptoms, fever, cough

, SOB, or diarrhea.  She denies recent sick contacts.  She does not remember 

the events that led to her coming to the hospital but states that her " 

called." 





Vital Signs: 











Temp Pulse Resp BP SpO2 FiO2


 


97.6 F 70 36 150/94 91 


 


03/27/20 10:00 03/27/20 10:00 03/27/20 10:00 03/27/20 10:00 03/27/20 10:00 











Physical Exam: 





General: Alert but appears groggy, NAD





HEENT: Normocephalic, atraumatic, non-icteric sclera, moist oral mucosa





Neck: soft, supple, no JVD





CV: Regular rate and rhythm, no murmurs or rubs





Pulm/Chest: Increased work of breathing with tachypnea, good bilateral air entry

, no rhonchi or rales, no wheeze





Abdomen/GI: soft, non-tender, non-distended, +BS noted





MSK/Skin: warm, dry, intact, +2 pulses, no edema or cyanosis





Neuro: A&Ox3, no gross focal deficits





Psych: Appropriate affect





Fluid Balance (Past 24 Hours): 





 Intake & Output











 03/25/20 03/26/20 03/27/20 03/28/20





 06:59 06:59 06:59 06:59


 


Intake Total  240 2658 


 


Output Total  400 0 0


 


Balance  -160 2658 0


 


Weight  190 lb 199 lb 8.293 oz 


 


Intake:    


 


  IV Fluids   1867 


 


    NS (0.9%)   1558 


 


    vanco   223 


 


    zosyn   86 


 


  IVPB   492 


 


  Medicated IV   299 


 


    CC - Dexmedetomidine/   299 





    Precedex    


 


  Oral  240 0 


 


Output:    


 


  Urine  400 0 0


 


Other:    


 


  Estimated Void   Large 








Labs: 


 Laboratory Results - last 24 hr











  03/26/20 03/26/20 03/27/20





  11:36 11:36 04:17


 


WBC   


 


RBC   


 


Hgb   


 


Hct   


 


MCV   


 


MCH   


 


MCHC   


 


RDW   


 


Plt Count   


 


MPV   


 


Sodium    137


 


Potassium   TNP  4.0


 


Chloride    110


 


Carbon Dioxide    20 L


 


Anion Gap    7


 


BUN    14


 


Creatinine    0.73


 


Est GFR ( Amer)    102.1


 


Est GFR (Non-Af Amer)    84.4


 


BUN/Creatinine Ratio    19.2


 


Glucose    127 H


 


Calcium    8.4 L


 


Ammonia  TNP  


 


Total Creatine Kinase    4311 H














  03/27/20





  04:17


 


WBC  17.6 H


 


RBC  5.25 H


 


Hgb  16.7 H


 


Hct  49 H


 


MCV  94


 


MCH  32 H


 


MCHC  34


 


RDW  13


 


Plt Count  195


 


MPV  9.4


 


Sodium 


 


Potassium 


 


Chloride 


 


Carbon Dioxide 


 


Anion Gap 


 


BUN 


 


Creatinine 


 


Est GFR ( Amer) 


 


Est GFR (Non-Af Amer) 


 


BUN/Creatinine Ratio 


 


Glucose 


 


Calcium 


 


Ammonia 


 


Total Creatine Kinase 











Studies: 





CT brain 3/25/20:  IMPRESSION:  No acute intracranial pathology. 





Chest xray 3/25/20: IMPRESSION: PATCHY ATELECTASIS VERSUS CONSOLIDATION OF THE 

RIGHT LUNG BASE.





CT abd/pelvis 3/25/20:  IMPRESSION:  1. There is mild wall thickening and 

possible wall edema involving the proximal duodenum, cannot exclude mild 

duodenitis.  2. There is colonic diverticulosis without evidence for acute 

diverticulitis.  3. No acute traumatic CT pathology of the abdomen or pelvis. 





Thoracic spine CT 3/25/20: IMPRESSION: No acute thoracic spine fracture or 

other acute pathology. 





EEG 3/26/20:  CLINICAL IMPRESSION:  Abnormal EEG due to excessive beta rhythms 

consistent with benzodiazepine drug 


effect.  In addition, there is diffuse slowing and disorganization of 

background rhythm consistent with diffuse 


encephalopathy.  There is suggestion of episodic focal slowing from the left 

frontal temporal region suggestive of 


possible focal neuronal dysfunction in the left frontotemporal region.  There 

are no ictal events and no epileptiform 


features to this recording.





Chest xray 3/26/20:  IMPRESSION: SCATTERED AREAS OF AIRSPACE DISEASE IN THE 

RIGHT BASE MAY REPRESENT


ATELECTASIS VERSUS EARLY PNEUMONIA AND IS UNCHANGED FROM MARCH 25, 2020.





Chest xray 3/27/20: IMPRESSION: BIBASILAR AIRSPACE DISEASE, LIKELY REPRESENTING 

ATELECTASIS.





Impression: 





Ms. Johnson is a 50 yo F with a PMH of smoking, substance abuse who was 

admitted on 3/26/20 with concern for seizure or overdose with reported use of 

gabapentin and suboxone, now with concern for tachypnea.





Diagnoses:


* Seizure


* Tachypnea with acute hypoxic respiratory failure


* Suspected overdose of gabapentin and/or suboxone


* Rhabdomyolysis





Plan: 





Neuro:


- Alert, somewhat groggy but oriented x 3


- Suspect related to overdose, potentially related to reported seizure


- CT brain negative


- Continue keppra


- Avoid benzos or sedatives


- No evidence of withdrawal





CVS:


- Hemodynamically stable





Resp:


- Tachypneic and mildly hypoxic despite normal chest xray, acute hypoxic 

respiratory failure, improving


- Plan to check CTA chest to rule out PE and echo 


- Smoker but no documented history of COPD, plan to add solumedrol and 

albuterol nebulizers


- Some wheezing noted this afternoon, suspect mild volume overload, place wheat 

and lasix x 1


- ? ingestion with metabolic acidosis driving tachypnea but no evidence based 

on normal serum bicarb and normal anion gap


- Wean Fio2 to keep sat > 92%





ID:


- SIRS on arrival, likely related to seizure and overdose


- Cxray negative, UA pending, BC NGTD.  No evidence of meningitis.


- Did have N/V prior to development of seizure like activity.  ? mild 

duodenitis.


- Has new back pain which she attributes to lying in bed, MRI spine pending.


- Continue vanco and zosyn while work up continues





GI:


- Report of N/V for several days prior to arrival, CT abd with finding of mild 

duodenitis only


- Nutrition, clear liquid


- GI prophylaxis with protonix 





Renal:


- Incontinent and unable to tolerate the surewick, place wheat for close 

monitoring of I/Os


- Creatinine normal





Heme:


- Heme-concentrated


- Continue NS





Endo:


- BG normal, no hx of diabetes





Musculoskeletal/Skin:


- pressure ulcer prophylaxis. 





Wounds:


- none





Nutrition:





DVT prophylaxis: Heparin SQ








Disposition:  Patient requires critical care/ICU for overdose, respiratory 

distress





Patient Clinical Status:  Critical





Code Status: Full Code





Total Critical Care time is 60 minutes, excluding procedures/teaching

## 2020-03-27 NOTE — ECHO
*[facility]*

[place] [address]

Phone: [phone]

Fax #: [phone]



-------------------------------------------------------------------

Transthoracic Echocardiogram



Patient: Demetrice Johnson                 MRN:        [id number]

:     1970                         Study Date: 2020

Age:     49                                 Accession#: [id number]

Gender:  F                                  HR:         72 bpm

Height:  66 in /167.6 cm                    BSA:        2 m^2

Weight:  198.6 lb /90.3 kg                  BMI:        32.1 kg/m^2



*Sonographer: Kassandra Burton

 

*Referring Physician: * Lawson TaylorReading Physician: * Darshan Reed MD



-------------------------------------------------------------------

Indications:   SOB.



-------------------------------------------------------------------

History:   Risk factors:  Polysubstance abuse.  Current tobacco

use. Hypertension.



-------------------------------------------------------------------

Conclusions



Summary:



- Left ventricle: Systolic function is normal. The estimated

  ejection fraction is 60-65%.

- No significant valvular disease.



-------------------------------------------------------------------

Study data:  Transthoracic echocardiogram.  Procedure:

Transthoracic echocardiography was performed. Image quality was

good.  Complete 2D, spectral Doppler, and color flow Doppler.

Location:  ICU  Patient status:  Inpatient. Patient room number: 2.

No prior study is available for comparison.  Rhythm:  Normal sinus

rhythm.



-------------------------------------------------------------------

Findings



Left ventricle:  The cavity size is normal. Wall thickness is

normal. Systolic function is normal. The estimated ejection

fraction is 60-65%. Wall motion is normal; there are no regional

wall motion abnormalities. Left ventricular diastolic function

parameters are normal.

Right ventricle:  The cavity size is normal. Systolic function is

normal.

Left atrium:  The atrium is normal in size.

Right atrium:  The atrium is normal in size.

Atrial septum:  No defect or patent foramen ovale is identified.

Mitral valve:   The valve is structurally normal.    There is no

evidence of stenosis.   There is no significant regurgitation.

Aortic valve:  Not well visualized.  The valve is probably

trileaflet.    There is no evidence of stenosis.   There is no

significant regurgitation.

Tricuspid valve:   The valve is structurally normal.    There is no

evidence of stenosis.   There is no significant regurgitation.

Pulmonic valve:    The valve is structurally normal.    There is no

evidence of stenosis.   There is no significant regurgitation.

Aorta:  The aortic root appears normal. The aortic arch appears

normal.

Pericardium:  There is no significant pericardial effusion.

Pulmonary arteries:

Systolic pressure can not be accurately estimated.



Systemic veins:

Inferior vena cava: The vessel is normal in size. There is (&gt;= 50%)

respiratory change in the IVC dimension.

Pulmonary veins:  Not well visualized.



-------------------------------------------------------------------

Measurements



 Left ventricle            Value        Ref         Aortic valve             Value       Ref

 MEL, LAX                  4.6   cm     3.8 - 5.2   Peak v, S                0.87  m/sec ----

 ESD, LAX                  3.2   cm     2.2 - 3.5   VTI, S                   25.0  cm    ----

 FS, LAX                   30    %      27 - 45     Mean grad, S             4.0   mm Hg ----

 PW, ED, LAX               0.9   cm     0.6 - 0.9   Peak grad, S             3.0   mm Hg ----

 E&apos;, lat renata, TDI          14.3  cm/sec &gt;=10.0      RUBEN, VTI                 2.73  cm^2  ----


 E/e&apos;, lat renata,            6            ----------  RUBEN, Vmax                3.63  cm^2  ----

 TDI

                                                    Mitral valve             Value       Ref

 LVOT                      Value        Ref         Peak E                   0.87  m/sec ----

 Diam, S                   2.00  cm     ----------  Peak A                   0.64  m/sec ----

 Area                      3.1   cm^2   ----------  Decel time               201   ms    ----

 Peak ever, S               1     m/sec  ----------  Peak grad, D             3.0   mm Hg ----

 Mean grad, S              2     mm Hg  ----------  Peak E/A ratio           1.4         ----

 SV                        72    ml     ----------

                                                    Pulmonic valve           Value       Ref

 Ventricular septum        Value        Ref         Peak v, S                0.62  m/sec ----

 IVS, ED                   0.9   cm     0.6 - 0.9   Peak grad, S             2.0   mm Hg ----

 

 Right ventricle           Value        Ref         Aortic root              Value       Ref

 MEL, LAX                  2.6   cm     ----------  Root diam                3.1   cm    &lt;4.1

 MEL minor ax,             3.3   cm     1.9 - 3.5

 A4C mid                                            Ascending aorta          Value       Ref

                                                    AAo AP diam, S           3.1   cm    ----

 Left atrium               Value        Ref

 AP dim, ES                3.30  cm     2.70 -      Aortic arch              Value       Ref

                                        3.80        Arch diam                2.0   cm    ----

 ML dim, A4C               2.8   cm     ----------

 SI dim, A4C               4.1   cm     ----------  Decending aorta          Value       Ref

 Vol/bsa, ES, 1-p          12    ml/m^2 11 - 40     Janeth peak ever             0.95  m/sec ----

 A4C

                                                    Inferior vena cava       Value       Ref

 Right atrium              Value        Ref         Diam                     2.1   cm    ----

 SI dim, ES                3.4   cm     3.4 - 5.3

 ML dim, ES, A4C           4.3   cm     2.6 - 4.4

 SI dim, ES, A4C           3.4   cm     3.4 - 5.3

 

Legend:

(L)  and  (H)  vignesh values outside specified reference range.



Prepared and electronically signed by



Darshan Reed MD

2020 14:46

## 2020-03-27 NOTE — PN
NEUROLOGY FOLLOWUP NOTE:

 

DATE OF FOLLOWUP:  03/27/20

 

HOSPITALIST:  Erika Mccormack NP

 

LOCATION:  She is in the ICU bed 2.

 

CHIEF COMPLAINT:  Seizure, encephalopathy.

 

INTERVAL HISTORY:  Since yesterday, Demetrice is alert.  She does not have a 
headache today.  She still has some interscapular back pain, but it is not bad.
  She does not notice any numbness in her legs.  Currently, she is having 
shortness of breath and that is being evaluated.

 

MEDICATIONS:  Reviewed and she is on:

1.  Levetiracetam 750 mg IV q.12 hours.

2.  Folic acid 1 mg p.o. daily.

3.  Protonix 40 mg p.o. daily.

4.  Vancomycin dosed by pharmacy.

5.  Zosyn dosed by pharmacy.

6.  Vancomycin 1000 mg IV q.8 hours.

 

PHYSICAL EXAMINATION:  On exam, she is tachypneic, but alert and conversant.  
Last blood pressure in the record 146/100, temperature 98.3 by temporal scan.  
Speech is clear.  Eye movements are full.  Facial musculature is symmetric.

 

DIAGNOSTIC STUDIES/LAB DATA:  Laboratory data is notable for a CPK down to 4311 
this morning, renal function appears normal.  Calcium is borderline low at 8.4. 
CBC is notable for an elevated white blood cell count at 17.6 which is down 
from 22 yesterday.  Hemoglobin is down a little bit to 16.7, but remains high.

 

Blood cultures are negative after 2 days.

 

IMPRESSION:  Impression is that of seizures in the setting of substance abuse.  
She still needs an MRI of her brain at some point, but it is not urgent.  
Currently, her pulmonary status is a priority.  I do not think she needs an MRI 
of her thoracic spine at this point, as if she had an epidural abscess, I would 
not expect her to be doing so well in terms of neurological function.  I will 
change her Keppra to oral formulation at 500 mg twice per day.  I will continue 
to follow her along with you.

 

 [id number]/CPS #: [id number]

[facility]

## 2020-03-28 VITALS — SYSTOLIC BLOOD PRESSURE: 149 MMHG | DIASTOLIC BLOOD PRESSURE: 93 MMHG

## 2020-03-28 LAB
ANION GAP SERPL CALC-SCNC: 11 MMOL/L (ref 2–11)
BUN SERPL-MCNC: 17 MG/DL (ref 6–24)
BUN/CREAT SERPL: 21.3 (ref 8–20)
CALCIUM SERPL-MCNC: 8.7 MG/DL (ref 8.6–10.3)
CHLORIDE SERPL-SCNC: 103 MMOL/L (ref 101–111)
GLUCOSE SERPL-MCNC: 158 MG/DL (ref 70–100)
HCO3 SERPL-SCNC: 22 MMOL/L (ref 22–32)
HCT VFR BLD AUTO: 45 % (ref 35–47)
HGB BLD-MCNC: 15.7 G/DL (ref 12–16)
MCH RBC QN AUTO: 32 PG (ref 27–31)
MCHC RBC AUTO-ENTMCNC: 35 G/DL (ref 31–36)
MCV RBC AUTO: 91 FL (ref 80–97)
PLATELET # BLD AUTO: 226 10^3/UL (ref 150–450)
POTASSIUM SERPL-SCNC: 3.8 MMOL/L (ref 3.5–5)
RBC # BLD AUTO: 4.96 10^6 /UL (ref 3.7–4.87)
SODIUM SERPL-SCNC: 136 MMOL/L (ref 135–145)
WBC # BLD AUTO: 14.3 10^3/UL (ref 3.5–10.8)

## 2020-03-28 RX ADMIN — MORPHINE SULFATE PRN MG: 4 INJECTION, SOLUTION INTRAMUSCULAR; INTRAVENOUS at 00:33

## 2020-03-28 RX ADMIN — Medication SCH MG: at 07:53

## 2020-03-28 RX ADMIN — MORPHINE SULFATE PRN MG: 4 INJECTION, SOLUTION INTRAMUSCULAR; INTRAVENOUS at 08:29

## 2020-03-28 RX ADMIN — NICOTINE SCH PATCH: 7 PATCH TRANSDERMAL at 08:29

## 2020-03-28 RX ADMIN — METHYLPREDNISOLONE SODIUM SUCCINATE SCH MG: 125 INJECTION, POWDER, FOR SOLUTION INTRAMUSCULAR; INTRAVENOUS at 07:52

## 2020-03-28 RX ADMIN — MORPHINE SULFATE PRN MG: 4 INJECTION, SOLUTION INTRAMUSCULAR; INTRAVENOUS at 06:12

## 2020-03-28 RX ADMIN — LEVETIRACETAM SCH MG: 500 TABLET, FILM COATED ORAL at 07:52

## 2020-03-28 RX ADMIN — PIPERACILLIN AND TAZOBACTAM SCH MLS/HR: 3; .375 INJECTION, POWDER, LYOPHILIZED, FOR SOLUTION INTRAVENOUS; PARENTERAL at 07:52

## 2020-03-28 RX ADMIN — THERA TABS SCH TAB: TAB at 07:53

## 2020-03-28 RX ADMIN — WATER SCH: 100 INJECTION, SOLUTION INTRAVENOUS at 05:59

## 2020-03-28 RX ADMIN — MORPHINE SULFATE PRN MG: 4 INJECTION, SOLUTION INTRAMUSCULAR; INTRAVENOUS at 02:52

## 2020-03-28 RX ADMIN — PANTOPRAZOLE SODIUM SCH MG: 40 TABLET, DELAYED RELEASE ORAL at 07:53

## 2020-03-28 RX ADMIN — HEPARIN SODIUM SCH UNITS: 5000 INJECTION INTRAVENOUS; SUBCUTANEOUS at 06:12

## 2020-03-28 RX ADMIN — FOLIC ACID SCH MG: 1 TABLET ORAL at 07:52

## 2020-03-28 NOTE — PN
Date of Service: 03/28/20


Critical Care Services: 





Ms. Johnson states that she is feeling much better this morning.  Her shortness 

of breath is much improved.  She denies chest pain.  She denies nausea, 

vomiting or abdominal pain and is eager to eat breakfast.





She continues to have back pain.  She is not sure when this started, likely a 

couple of days ago.  She notes having seizure like activity a couple of days 

before admission.  Though EMS was called at that time by her , she 

refused transport.  She suspects that her back pain began then.  Her pain has 

been well-controlled on morphine.  She does have a history of substance abuse, 

was using suboxone (not prescribed) outpatient (as well as other substances). 


Vital Signs: 











Temp Pulse Resp BP SpO2 FiO2


 


99.6 F 84 22 163/94 95 


 


03/28/20 07:21 03/28/20 08:00 03/28/20 08:29 03/28/20 08:00 03/28/20 08:00 











Physical Exam: 





General: Alert, NAD





HEENT: Normocephalic, atraumatic, non-icteric sclera, moist oral mucosa





Neck: soft, supple, no JVD





CV: Regular rate and rhythm, no murmurs or rubs





Pulm/Chest: Good bilateral air entry, no rhonchi or rales, no wheeze





Abdomen/GI: soft, nontender, nondistended, +BS noted





MSK/Skin: warm, dry, intact, +2 pulses+, no edema or cyanosis





Neuro: A&Ox3, no gross focal deficits





Psych: Appropriate affect





Fluid Balance (Past 24 Hours): 











 Intake & Output











 03/26/20 03/27/20 03/28/20 03/29/20





 06:59 06:59 06:59 06:59


 


Intake Total 240 2658 2273 


 


Output Total 400 0 1025 85


 


Balance -160 2658 1248 -85


 


Weight 190 lb 199 lb 8.293 oz 203 lb 0.732 oz 


 


Intake:    


 


  IV Fluids  1867 1287 


 


    NS (0.9%)  1558 1287 


 


    vanco  223  


 


    zosyn  86  


 


  IVPB  492  


 


  Medicated IV  299 461 


 


    CC - Dexmedetomidine/  299 461 





    Precedex    


 


  Oral 240 0 525 


 


Output:    


 


  Urine 400 0 0 


 


  Wheat   1025 85


 


Other:    


 


  Estimated Void  Large  








Labs: 


 Laboratory Results - last 24 hr











  03/26/20 03/27/20 03/28/20





  08:59 14:45 04:35


 


WBC    14.3 H


 


RBC    4.96 H


 


Hgb    15.7


 


Hct    45


 


MCV    91


 


MCH    32 H


 


MCHC    35


 


RDW    13


 


Plt Count    226


 


MPV    9.5


 


Sodium   


 


Potassium   


 


Chloride   


 


Carbon Dioxide   


 


Anion Gap   


 


BUN   


 


Creatinine   


 


Est GFR ( Amer)   


 


Est GFR (Non-Af Amer)   


 


BUN/Creatinine Ratio   


 


Glucose   


 


Calcium   


 


Total Creatine Kinase   


 


Urine Color   Yellow 


 


Urine Appearance   Cloudy 


 


Urine pH   5.0 


 


Ur Specific Gravity   1.027 


 


Urine Protein   Negative 


 


Urine Ketones   1+ A 


 


Urine Blood   1+ A 


 


Urine Nitrate   Negative 


 


Urine Bilirubin   Negative 


 


Urine Urobilinogen   Negative 


 


Ur Leukocyte Esterase   Negative 


 


Urine WBC (Auto)   Absent 


 


Urine RBC (Auto)   Absent 


 


Urine Bacteria   Absent 


 


Urine Glucose   Negative 


 


Gabapentin  0.7 L  














  03/28/20





  04:35


 


WBC 


 


RBC 


 


Hgb 


 


Hct 


 


MCV 


 


MCH 


 


MCHC 


 


RDW 


 


Plt Count 


 


MPV 


 


Sodium  136


 


Potassium  3.8


 


Chloride  103


 


Carbon Dioxide  22


 


Anion Gap  11


 


BUN  17


 


Creatinine  0.80


 


Est GFR ( Amer)  91.9


 


Est GFR (Non-Af Amer)  75.9


 


BUN/Creatinine Ratio  21.3 H


 


Glucose  158 H


 


Calcium  8.7


 


Total Creatine Kinase  2747 H


 


Urine Color 


 


Urine Appearance 


 


Urine pH 


 


Ur Specific Gravity 


 


Urine Protein 


 


Urine Ketones 


 


Urine Blood 


 


Urine Nitrate 


 


Urine Bilirubin 


 


Urine Urobilinogen 


 


Ur Leukocyte Esterase 


 


Urine WBC (Auto) 


 


Urine RBC (Auto) 


 


Urine Bacteria 


 


Urine Glucose 


 


Gabapentin 











Studies: 


                                                                              

4311 H


CT brain 3/25/20:  IMPRESSION:  No acute intracranial pathology. 





Chest xray 3/25/20: IMPRESSION: PATCHY ATELECTASIS VERSUS CONSOLIDATION OF THE 

RIGHT LUNG BASE.





CT abd/pelvis 3/25/20:  IMPRESSION:  1. There is mild wall thickening and 

possible wall edema involving the proximal duodenum, cannot exclude mild 

duodenitis.  2. There is colonic diverticulosis without evidence for acute 

diverticulitis.  3. No acute traumatic CT pathology of the abdomen or 


pelvis. 





Thoracic spine CT 3/25/20: IMPRESSION: No acute thoracic spine fracture or 

other acute pathology. 





EEG 3/26/20:  CLINICAL IMPRESSION:  Abnormal EEG due to excessive beta rhythms 

consistent with benzodiazepine drug effect.  In addition, there is diffuse 

slowing and disorganization of background rhythm consistent with diffuse 

encephalopathy.  There is suggestion of episodic focal slowing from the left 

frontal temporal region suggestive of possible focal neuronal dysfunction in 

the left frontotemporal region.  There are no ictal events and no epileptiform 

features to this recording.





Chest xray 3/26/20:  IMPRESSION: SCATTERED AREAS OF AIRSPACE DISEASE IN THE 

RIGHT BASE MAY REPRESENT ATELECTASIS VERSUS EARLY PNEUMONIA AND IS UNCHANGED 

FROM MARCH 25, 2020.





Chest xray 3/27/20: IMPRESSION: BIBASILAR AIRSPACE DISEASE, LIKELY REPRESENTING 

ATELECTASIS.





CTA Chest 3/27/20:IMPRESSION: 1.  No pulmonary embolus is detected. 2.  T5 and 

T6 compression fractures result and 50% and 10% vertebral body height loss 

respectively. The acuity of these findings is suggested by inferior endplate 

trabecular bone "impaction". 3.  Mild emphysema.





Impression: 


Ms. Johnson is a 48 yo F with a PMH of smoking, substance abuse who was 

admitted on 3/26/20 with concern for seizure or overdose with reported use of 

gabapentin and suboxone, now with concern for tachypnea.





Diagnoses:


* Seizure


* Tachypnea with acute hypoxic respiratory failure, resolved, suspected due to 

combination of fluid overload, COPD exacerbation, and back pain 


* Suspected overdose of gabapentin and/or suboxone


* Rhabdomyolysis








Plan: 





Neuro:


- Alert oriented x 3, AMS related to overdose and seizure


- CT brain negative


- Continue keppra oral


- Will need follow up Dr Christina outpatient for MRI brain





Pain and Substance Abuse Hx:


- Plan to discharge to home on suboxone


- Follow up [facility]





[facility]:


- Hemodynamically stable





Resp:


- Tachypnea and hypoxia resolved 


- Suspect combination of poor inspiratory effort and atelectasis due to back 

pain with new compression fractures, COPD with smoking hx, of mild volume 

overload with fluids given for rhabdomyolysis


- CTA chest negative


- Smoker but no documented history of COPD, switch to prednisone for quick taper


- Ambulate 





ID:


- SIRS on arrival, likely related to seizure and overdose


- Cxray negative, UA negative, BC NGTD.  No evidence of meningitis.


- Did have N/V prior to development of seizure like activity.  ? mild 

duodenitis.


- Has new back pain which she attributes to lying in bed, compression fractures 

noted, BC negative, MRI cancelled


- Stop abx





GI:


- Report of N/V for several days prior to arrival, CT abd with finding of mild 

duodenitis only


- Nutrition, advance diet





Renal:


- Remove wheat, creatinine normal


- Encourage fluid intake, for mildly elevated CK (greatly improved)





Heme:


- Heme-concentrated


- Continue NS





Endo:


- BG normal, no hx of diabetes





Musculoskeletal/Skin:


- pressure ulcer prophylaxis. 





Wounds:


- none





Nutrition:





DVT prophylaxis: Heparin SQ








Disposition:  Discharge to home, pending completion of discharge planning





Patient Clinical Status:  Improved, stable





Code Status: Full Code





Total Critical Care time is 40 minutes, excluding procedures/teaching

## 2020-03-28 NOTE — DS
HOSPITAL MEDICINE DISCHARGE SUMMARY:

 

DATE OF ADMISSION:  03/26/20

 

DATE OF DISCHARGE:  03/28/20

 

PRIMARY CARE PHYSICIAN:  Dr. Amador.

 

ATTENDING PHYSICIAN:  Dr. Lawson Taylor * (dictation provided by Erika Mccormack NP).

 

PRIMARY DIAGNOSES:

1.  New-onset seizure.

2.  Substance abuse disorder.

3.  Rhabdomyolysis.

4.  Acute hypoxic respiratory failure, resolved.

5.  Chronic obstructive pulmonary disease.

 

SECONDARY DIAGNOSES:

1.  History of smoking.

2.  Anxiety.

 

MEDICATIONS AT THE TIME OF DISCHARGE:

1.  Suboxone 8/2 b.i.d. (new prescription).

2.  Prednisone 40 mg x5 days (new prescription).

3.  Keppra 500 mg p.o. b.i.d. (new prescription).

4.  Albuterol inhaler 1 to 2 puffs q.4 hours p.r.n. shortness of breath/
wheezing (new prescription).

 

HOSPITAL COURSE:  Ms. Johnson is a 49-year-old female with a past medical 
history of anxiety and substance abuse disorder as well as smoking, who 
presents to the hospital on 03/25/20 with concern for a new-onset seizure.  
Please see the dictated H and P from Dr. Garcia for complete details.  In brief
, the patient's  noted that the patient was having seizure-like 
movements.  She has no history of seizure.  The patient stated in the days 
leading up to this that she had had nausea and vomiting and been mostly staying 
in bed.  She had had an episode of apparent seizure approximately 2 days prior 
to admission.  The  had called EMS, but the patient had refused 
transfer.  On the day of arrival to the ED, the patient had been described to 
be unresponsive, "not breathing, her face was turning black; she was diaphoretic
, foaming at the mouth and her muscles would get extremely tense and he could 
not break her muscle tension."  The patient reported that she had been taking 
her 's gabapentin and Suboxone for back pain.  There is also question of 
potential other opiate abuse. The patient's toxicology screen came back 
positive for cannabinoids and opiates.  The patient had no nuchal rigidity, no 
Brudzinski sign, no Kernig sign.

 

In the emergency room, the patient had CT brain, which was read as follows:  "
No acute intracranial pathology." The patient had abdomen and pelvis CT out of 
concern for nausea, vomiting, and questionable abdominal pain.  It was read as 
follows:  "There is mild wall thickening and possible wall edema involving the 
proximal duodenum, cannot exclude mild duodenitis. There is chronic 
diverticulosis without evidence for acute diverticulitis.  No acute traumatic 
CT pathology of the abdomen or pelvis." She had a chest x-ray which showed, 
"patchy atelectasis versus consolidation of the right lung base."



For her back pain, she had a thoracic spine CT, which showed, "no acute 
thoracic spine fracture acute pathology." Her labs at the time of arrival 
showed white blood cell count of 24.8 with a hemoglobin of 17.2 and hematocrit 
of 50.  She had a rhabdomyolysis with a CK of 11,708.  BUN and creatinine were 
normal.  Her troponin was normal.  Her CRP was normal.  Her prolactin was 
normal.

 

The patient was admitted to the intensive care unit because of her new-onset 
seizure.  She had an EEG, which showed the following: "Abnormal EEG due to 
excessive beta rhythms consistent with benzodiazepine drug effect.  In addition
, there is diffuse slowing and disorganization of background rhythm consistent 
with diffuse encephalopathy.  There is suggestion of episodic focal slowing 
from the left frontotemporal region suggestive of possible focal neuronal 
dysfunction in the left frontotemporal region.  There are no ictal events and 
no epileptiform features to this recording."  Also, for new-onset seizure, the 
patient was seen in consultation by Dr. Christina.  He started the patient on 
Keppra IV and this has now been transitioned to Keppra orally.  He initially 
recommended MRI of the brain, but ultimately has decided this can be followed 
up outpatient.

 

On admission, the patient was noted to be tachypneic.  This continued during 
the first day in the ICU.  She was also mildly hypoxic requiring 2 to 4 L of 
nasal cannula.  Her chest x-ray was clear.  She has a history of smoking and 
there was suspicion that perhaps some of these symptomatology and acute 
respiratory failure was related to COPD.  She was given Solu-Medrol for this.  
There was also suspicion that she had mild fluid overload related to fluid 
hydration given her rhabdomyolysis and she was given a diuretic.  Further 
workup for her hypoxia and tachypnea included a transthoracic echocardiogram, 
which showed no wall motion or valvular abnormalities and an intact ejection 
fraction.  She had a chest CTA to rule out PE.  It showed no PE, but did show 
T5 and T6 compression fractures resulting in 50% and 10% vertebral body height 
loss respectively and acuity of these findings are suggested by inferior 
endplate trabecular bone impaction.

 

Ms. Johnson is feeling much better today.  She is satting 90% or greater 
without oxygen.  She is ambulating on the unit independently.  Her white blood 
cell count is now 14.  Her CK is down to 2000.  Again, her BUN and creatinine 
remain normal. She is no longer tachypneic.  Her blood pressure is stable.  Her 
blood cultures have remained negative.  Her urinalysis is negative.

 

Ms. Johnson is medically stable for discharge to home.  She will be following 
up with Dr. Christina regarding new-onset seizure and for MRI brain.  In terms of 
her pain and history of substance abuse, she has been started on Suboxone and 
has been provided with information about both [email or web address] and the [facility].  I strongly encouraged her to follow up with one of these 
organizations regarding continuation of Suboxone and support with pain.  She 
has been given a short course of prednisone, for potential COPD exacerbation.  

 

Ms. Johnson is medically stable for discharge to home.

 

DISPOSITION:  To home.

 

DIET:  Regular.

 

ACTIVITY:  As tolerated.

 

FOLLOWUP PLANS:

1.  Please follow up with Dr. Christina regarding new-onset seizures and MRI 
brain.

2.  Please follow up with [email or web address] or [facility] for continuation of 
Suboxone.

3.  Please follow up with Dr. Amador regarding this acute inpatient 
hospitalization.

 

The patient was strongly encouraged to return to the emergency room should she 
have shortness of breath or any other concerning symptom.

 

TIME SPENT:  Approximately 60 minutes was spent on the discharge of this patient
, more than half that time was spent with the patient at the bedside, reviewing 
the events leading up to and during this hospitalization and reviewing the plan 
of care.

 

____________________________________ 

ERIKA MCCORMACK, KAILA

 

[id number]/CPS #: [id number]

[facility]

## 2024-05-09 NOTE — ED
Complex/Multi-Sys Presentation





- HPI Summary


HPI Summary: 


48 y/o F brought in by EMS from home for concern for seizure witnessed seizure 

by . From home, does not remember what happened. Has been feeling sick 

recently with nausea/vomiting and diarrhea for 3 days. Tmax 100.7F. Some 

abdominal pain in middle of abdomen. No medical problems. No hx seizures, no 

FHx seizures. Reporting back pain. Patient thinks pain from being in bed. Feels 

like a burning pain. Reporting 10/10 band like back pain. No hx back pain. No 

trauma. Symptoms aggravated by nothing. Symptoms alleviated by nothing. 

Medications reviewed. 





- History Of Current Complaint


Chief Complaint: EDGeneral


Time Seen by Provider: 03/25/20 15:56


Hx Obtained From: Patient


Onset/Duration: Still Present


Aggravating Factor(s): Nothing


Alleviating Factor(s): Nothing





- Allergies/Home Medications


Allergies/Adverse Reactions: 


 Allergies











Allergy/AdvReac Type Severity Reaction Status Date / Time


 


No Known Allergies Allergy   Verified 04/05/19 07:09











Home Medications: 


 Home Medications





NK [No Home Medications Reported]  03/25/20 [History Confirmed 03/25/20]











PMH/Surg Hx/FS Hx/Imm Hx


Sensory History: Reports: Hx Contacts or Glasses - GLASSES


Opthamlomology History: Reports: Hx Contacts or Glasses - GLASSES


Neurological History: 


   Denies: Hx Seizures


Psychiatric History: Reports: Hx Anxiety





- Cancer History


Hx Chemotherapy: No


Hx Radiation Therapy: No





- Surgical History


Surgery Procedure, Year, and Place: TUBAL LIGATION, CHOLECYSTECTOMY [facility]


Hx Anesthesia Reactions: No


Infectious Disease History: No


Infectious Disease History: 


   Denies: History Other Infectious Disease, Traveled Outside the [place] in Last 30 

Days





- Family History


Known Family History: Positive: Cardiac Disease, Hypertension, Diabetes, Other 

- stroke, cancer, thyroid disease


   Negative: Seizure Disorder





- Social History


Alcohol Use: Rare


Substance Use Type: Reports: None


Hx Tobacco Use: Yes


Smoking Status (MU): Heavy Every Day Tobacco Smoker


Amount Used/How Often: 1 PPD


Have You Smoked in the Last Year: Yes





Review of Systems


Positive: Fever


Positive: Abdominal Pain, Vomiting, Diarrhea, Nausea


Positive: Other - back pain


Neurological/Mental Status: Other - seizure


All Other Systems Reviewed And Are Negative: Yes





Physical Exam





- Summary


Physical Exam Summary: 





Constitutional: Well-developed, Well-nourished, Alert. (-) Distressed


Skin: Warm, Dry


HENT: Normocephalic; Atraumatic


Eyes: Conjunctiva normal


Neck: Musculoskeletal ROM normal neck. (-) JVD, (-) Stridor, (-) Nuchal rigidity


Cardio: Rhythm regular, rate normal, Heart sounds normal; Intact distal pulses; 

Radial pulses are 2+ and symmetric. (-) Murmur


Pulmonary/Chest wall: Effort normal. (-) Respiratory distress, (-) Wheezes, (-) 

Rales


Abd: Soft, (-) tenderness, (-) Distension, (-) Guarding, (-) Rebound


Musculoskeletal: (-) Edema; lower thoracic and upper lumbar spine tenderness >L 

than R 


Lymph: (-) Cervical adenopathy


Neuro: Alert, Oriented x2. GCS 14


Psych: deferred


 


Triage Information Reviewed: Yes


Vital Signs On Initial Exam: 


 Initial Vitals











Temp Pulse Resp BP Pulse Ox


 


 100.0 F   89   16   178/109   94 


 


 03/25/20 15:52  03/25/20 15:52  03/25/20 15:52  03/25/20 15:52  03/25/20 15:52











Vital Signs Reviewed: Yes





Procedures





- Sedation


Patient Received Moderate/Deep Sedation with Procedure: No





- Lumbar Puncture


  ** Left Lateral


Lumbar Puncture Note: 





2 attempts


Unsuccessful








Diagnostics





- Vital Signs


 Vital Signs











  Temp Pulse Resp BP Pulse Ox


 


 03/25/20 15:52  100.0 F  89  16  178/109  94














- Laboratory


Result Diagrams: 


 03/28/20 04:35





 03/28/20 04:35


Lab Statement: Any lab studies that have been ordered have been reviewed, and 

results considered in the medical decision making process.





- CT


  ** BRAIN


CT Interpretation Completed By: Radiologist - IMPRESSION:  No acute 

intracranial pathology. ED physician has reviewed this imaging report.





  ** ABD/PEL


CT Interpretation Completed By: Radiologist - IMPRESSION:  1. There is mild 

wall thickening and possible wall edema involving the proximal  duodenum, 

cannot exclude mild duodenitis.  2. There is colonic diverticulosis without 

evidence for acute diverticulitis.  3. No acute traumatic CT pathology of the 

abdomen or pelvis. ED physician has reviewed this imaging report.





Complex Multi-Symp Course/Dx


Course Of Treatment: 48 y/o F p/w AMS and seizure as well as n/v/d.  - on 

arrival to ED patient confused, reporting back pain. Hx limited 2/2 patient 

confusion,  called but no significant hx obtained other than possible 

seizure. Hx drug use and EtOH use. Denies IVDU. DDx includes overodose/

substance use, EtOH w/d, meningitis (attempted LP unable to 2/2 patient 

tolerance and difficult tap, covered broadly w ABx). Also consider GI source 

infection w WBC 24, CT a/p unremarkable (bone cuts added for T spine pain). 

Also consider epidural abscess/osteo given fever and back pain. Denies SOB, CXR 

obtained in setting of L sided back pain to r/o PNA. Admit to medicine





- Diagnoses


Provider Diagnoses: 


 Altered mental status, Sepsis, Seizure








- Physician Notifications


Discussed Care Of Patient With: Lazarus [place] - Agrees to admit


Time Discussed With Above Provider: 20:27





Discharge ED





- Sign-Out/Discharge


Documenting (check all that apply): Patient Departure





- Discharge Plan


Condition: Stable


Disposition: ADMITTED TO [facility]





- Billing Disposition and Condition


Condition: STABLE


Disposition: Admitted to [facility]





- Attestation Statements


Document Initiated by Jasminibe: Yes


Documenting Scribe: Aretha Montgomery


Provider For Whom Heath is Documenting (Include Credential): Sandra Mejía MD


Scribe Attestation: 


IAretha, scribed for Sandra Mejía MD on 03/28/20 at 0714. 


Scribe Documentation Reviewed: Yes


Provider Attestation: 


The documentation as recorded by the scribAretha reece accurately reflects 

the service I personally performed and the decisions made by me, Sandra Mejía MD


Status of Scribe Document: Viewed General